# Patient Record
Sex: MALE | Race: ASIAN | Employment: FULL TIME | URBAN - METROPOLITAN AREA
[De-identification: names, ages, dates, MRNs, and addresses within clinical notes are randomized per-mention and may not be internally consistent; named-entity substitution may affect disease eponyms.]

---

## 2020-01-31 ENCOUNTER — HOSPITAL ENCOUNTER (EMERGENCY)
Facility: HOSPITAL | Age: 30
Discharge: HOME | End: 2020-01-31
Attending: EMERGENCY MEDICINE
Payer: COMMERCIAL

## 2020-01-31 VITALS
TEMPERATURE: 101 F | DIASTOLIC BLOOD PRESSURE: 82 MMHG | HEART RATE: 100 BPM | HEIGHT: 75 IN | OXYGEN SATURATION: 98 % | SYSTOLIC BLOOD PRESSURE: 145 MMHG | WEIGHT: 290 LBS | BODY MASS INDEX: 36.06 KG/M2 | RESPIRATION RATE: 20 BRPM

## 2020-01-31 DIAGNOSIS — R50.9 FEBRILE ILLNESS, ACUTE: ICD-10-CM

## 2020-01-31 DIAGNOSIS — J11.1 INFLUENZA: Primary | ICD-10-CM

## 2020-01-31 PROCEDURE — 99283 EMERGENCY DEPT VISIT LOW MDM: CPT

## 2020-01-31 RX ORDER — OSELTAMIVIR PHOSPHATE 75 MG/1
75 CAPSULE ORAL 2 TIMES DAILY
Qty: 10 CAPSULE | Refills: 0 | Status: SHIPPED | OUTPATIENT
Start: 2020-01-31 | End: 2020-02-05

## 2020-01-31 RX ORDER — ACETAMINOPHEN 500 MG
1000 TABLET ORAL ONCE
Status: DISCONTINUED | OUTPATIENT
Start: 2020-01-31 | End: 2020-01-31

## 2020-01-31 RX ORDER — IBUPROFEN 600 MG/1
600 TABLET ORAL ONCE
Status: COMPLETED | OUTPATIENT
Start: 2020-01-31 | End: 2020-01-31

## 2020-01-31 NOTE — ED PROVIDER NOTES
Patient Seen in: [facility] Emergency Department      History   Patient presents with:  Fever    Stated Complaint: fever, chills    HPI    61-year-old male presents emergency room for evaluation of fever with cough runny nose and body aches.   Nicole swelling, there is no mastoid erythema or tenderness. Scalp is atraumatic. NECK: Neck is supple, there is no nuchal rigidity. No carotid bruits. No masses. Trachea midline. No cervical lymphadenopathy. HEART: Regular rate and rhythm, no murmurs.   MARIBEL

## 2020-01-31 NOTE — ED INITIAL ASSESSMENT (HPI)
Body aches, chills started Thursday am, fever Thursday night. This am fever, body sweats, shaking of extremities.

## 2020-02-06 ENCOUNTER — PATIENT OUTREACH (OUTPATIENT)
Dept: FAMILY MEDICINE CLINIC | Facility: CLINIC | Age: 30
End: 2020-02-06

## 2020-02-06 NOTE — PROGRESS NOTES
Patient was seen in the ER on 1/31/20. Left message for patient to call office to schedule a follow up.

## 2021-11-07 ENCOUNTER — HOSPITAL ENCOUNTER (EMERGENCY)
Facility: HOSPITAL | Age: 31
Discharge: HOME | End: 2021-11-07
Attending: PEDIATRICS
Payer: COMMERCIAL

## 2021-11-07 VITALS
OXYGEN SATURATION: 96 % | BODY MASS INDEX: 34.19 KG/M2 | HEART RATE: 92 BPM | RESPIRATION RATE: 16 BRPM | TEMPERATURE: 98 F | HEIGHT: 75 IN | DIASTOLIC BLOOD PRESSURE: 77 MMHG | WEIGHT: 275 LBS | SYSTOLIC BLOOD PRESSURE: 145 MMHG

## 2021-11-07 DIAGNOSIS — S09.90XA INJURY OF HEAD, INITIAL ENCOUNTER: Primary | ICD-10-CM

## 2021-11-07 DIAGNOSIS — S01.81XA FACIAL LACERATION, INITIAL ENCOUNTER: ICD-10-CM

## 2021-11-07 PROCEDURE — 99283 EMERGENCY DEPT VISIT LOW MDM: CPT

## 2021-11-07 NOTE — ED PROVIDER NOTES
Patient Seen in: [facility] Emergency Department      History   Patient presents with:  Laceration/Abrasion    Stated Complaint: lac to eye post fall, pt sts he tripped and hit corner of table     Subjective:   HPI    Patient is a 16-year-old male h perfused. Dermatologic exam: Jagged 2.5 cm laceration to the left orbital area. No active bleeding no obvious foreign body. Neurologic exam: Cranial nerves 2-12 grossly intact. Orthopedic exam: normal,from.        ED Course   Labs Reviewed - No data t

## 2022-08-04 ENCOUNTER — HOSPITAL ENCOUNTER (EMERGENCY)
Facility: HOSPITAL | Age: 32
Discharge: HOME | End: 2022-08-04
Payer: COMMERCIAL

## 2022-08-04 ENCOUNTER — APPOINTMENT (OUTPATIENT)
Dept: GENERAL RADIOLOGY | Facility: HOSPITAL | Age: 32
End: 2022-08-04
Payer: COMMERCIAL

## 2022-08-04 VITALS
WEIGHT: 280 LBS | DIASTOLIC BLOOD PRESSURE: 63 MMHG | TEMPERATURE: 98 F | SYSTOLIC BLOOD PRESSURE: 124 MMHG | OXYGEN SATURATION: 99 % | HEIGHT: 75 IN | HEART RATE: 73 BPM | BODY MASS INDEX: 34.82 KG/M2 | RESPIRATION RATE: 16 BRPM

## 2022-08-04 DIAGNOSIS — M25.511 ACUTE PAIN OF RIGHT SHOULDER: Primary | ICD-10-CM

## 2022-08-04 PROCEDURE — 99283 EMERGENCY DEPT VISIT LOW MDM: CPT

## 2022-08-04 PROCEDURE — 73030 X-RAY EXAM OF SHOULDER: CPT

## 2022-08-04 RX ORDER — METHYLPREDNISOLONE 4 MG/1
TABLET ORAL
Qty: 1 EACH | Refills: 0 | Status: SHIPPED | OUTPATIENT
Start: 2022-08-04

## 2022-08-04 RX ORDER — CYCLOBENZAPRINE HCL 10 MG
10 TABLET ORAL 3 TIMES DAILY PRN
Qty: 20 TABLET | Refills: 0 | Status: SHIPPED | OUTPATIENT
Start: 2022-08-04 | End: 2022-08-11

## 2022-08-04 RX ORDER — NAPROXEN 500 MG/1
500 TABLET ORAL 2 TIMES DAILY PRN
Qty: 20 TABLET | Refills: 0 | Status: SHIPPED | OUTPATIENT
Start: 2022-08-04

## 2024-11-05 ENCOUNTER — APPOINTMENT (OUTPATIENT)
Dept: GENERAL RADIOLOGY | Age: 34
End: 2024-11-05
Attending: PHYSICIAN ASSISTANT
Payer: COMMERCIAL

## 2024-11-05 ENCOUNTER — HOSPITAL ENCOUNTER (EMERGENCY)
Age: 34
Discharge: HOME | End: 2024-11-05
Attending: EMERGENCY MEDICINE
Payer: COMMERCIAL

## 2024-11-05 VITALS
RESPIRATION RATE: 16 BRPM | BODY MASS INDEX: 37.92 KG/M2 | HEART RATE: 81 BPM | WEIGHT: 305 LBS | HEIGHT: 75 IN | SYSTOLIC BLOOD PRESSURE: 126 MMHG | OXYGEN SATURATION: 96 % | DIASTOLIC BLOOD PRESSURE: 70 MMHG | TEMPERATURE: 100 F

## 2024-11-05 DIAGNOSIS — B34.9 VIRAL SYNDROME: Primary | ICD-10-CM

## 2024-11-05 LAB
POCT INFLUENZA A: NEGATIVE
POCT INFLUENZA B: NEGATIVE
SARS-COV-2 RNA RESP QL NAA+PROBE: NOT DETECTED

## 2024-11-05 PROCEDURE — 87430 STREP A AG IA: CPT | Performed by: PHYSICIAN ASSISTANT

## 2024-11-05 PROCEDURE — 99284 EMERGENCY DEPT VISIT MOD MDM: CPT

## 2024-11-05 PROCEDURE — 87502 INFLUENZA DNA AMP PROBE: CPT | Performed by: PHYSICIAN ASSISTANT

## 2024-11-05 PROCEDURE — 71046 X-RAY EXAM CHEST 2 VIEWS: CPT | Performed by: PHYSICIAN ASSISTANT

## 2024-11-05 RX ORDER — ACETAMINOPHEN 500 MG
1000 TABLET ORAL ONCE
Status: COMPLETED | OUTPATIENT
Start: 2024-11-05 | End: 2024-11-05

## 2024-11-05 NOTE — ED INITIAL ASSESSMENT (HPI)
Sweating x5 days - states fevers first 3 days - can't stop sweating - denies cough - denies sore throat

## 2024-11-05 NOTE — ED PROVIDER NOTES
Patient Seen in: [facility] In [place]      History     Chief Complaint   Patient presents with    Fever     Stated Complaint: sweating x5 days - denies cough - c/o fevers    Subjective:   HPI      34-year-old male presents to the  for evaluation of fever and sweats for 5 days.  Tmax 102 at home.  No cough, sore throat or nasal congestions.  Developed a headache today.  Son with URI symptoms. No neck pain.    Objective:     History reviewed. No pertinent past medical history.           History reviewed. No pertinent surgical history.             Social History     Socioeconomic History    Marital status: Single   Tobacco Use    Smoking status: Never    Smokeless tobacco: Never   Vaping Use    Vaping status: Some Days   Substance and Sexual Activity    Alcohol use: Yes     Comment: rare    Drug use: Not Currently                  Physical Exam     ED Triage Vitals [11/05/24 1336]   /82   Pulse 76   Resp 15   Temp 99.5 °F (37.5 °C)   Temp src Oral   SpO2 98 %   O2 Device None (Room air)       Current Vitals:   Vital Signs  BP: 126/70  Pulse: 81  Resp: 16  Temp: 100.2 °F (37.9 °C)  Temp src: Oral    Oxygen Therapy  SpO2: 96 %  O2 Device: None (Room air)        Physical Exam  Vitals and nursing note reviewed.   Constitutional:       Appearance: He is well-developed.   HENT:      Head: Atraumatic.      Right Ear: Tympanic membrane and external ear normal.      Left Ear: Tympanic membrane and external ear normal.      Nose: Nose normal.      Mouth/Throat:      Mouth: Mucous membranes are moist.      Pharynx: Posterior oropharyngeal erythema present.   Eyes:      Conjunctiva/sclera: Conjunctivae normal.   Cardiovascular:      Rate and Rhythm: Normal rate and regular rhythm.   Pulmonary:      Effort: Pulmonary effort is normal.      Breath sounds: Normal breath sounds.   Musculoskeletal:      Cervical back: Normal range of motion and neck supple. No rigidity.   Skin:     General: Skin is warm  and dry.      Capillary Refill: Capillary refill takes less than 2 seconds.   Neurological:      Mental Status: He is alert and oriented to person, place, and time.   Psychiatric:         Mood and Affect: Mood normal.             ED Course     Labs Reviewed   RAPID SARS-COV-2 BY PCR - Normal   POCT FLU TEST - Normal    Narrative:     This assay is a rapid molecular in vitro test utilizing nucleic acid amplification of influenza A and B viral RNA.   RAPID STREP A SCREEN (LC) - Normal    Narrative:     A confirmatory culture is recommended if clinically indicated.          XR CHEST PA + LAT CHEST (CPT=71046)    Result Date: 11/5/2024  PROCEDURE:  XR CHEST PA + LAT CHEST (CPT=71046)  INDICATIONS:  sweating x5 days - denies cough - c/o fevers  COMPARISON:  [facility], XR, XR CHEST PA + LAT CHEST (CPT=71020), 12/21/2016, 8:36 AM.  TECHNIQUE:  PA and lateral chest radiographs were obtained.  PATIENT STATED HISTORY: (As transcribed by Technologist)  Patient has sweating and fever for 5 days.    FINDINGS:  LUNGS:  No focal consolidation.  Normal vascularity. CARDIAC:  Normal size cardiac silhouette. MEDIASTINUM:  Normal. PLEURA:  Normal.  No pleural effusions. BONES:  Normal for age.            CONCLUSION:  There is no evidence of active cardiopulmonary disease.   LOCATION:  [facility]   Dictated by (CST): Aly Carosn MD on 11/05/2024 at 3:53 PM     Finalized by (CST): Aly Carson MD on 11/05/2024 at 3:54 PM               MDM        Well-appearing 34-year-old male presents to the ER with fever and chills for 4 to 5 days.  Denies any symptoms.    Differential diagnosis includes but not limited to:  Influenza, pneumonia, strep, COVID    Flu, strep and COVID are negative.  Chest x-ray shows no acute abnormality.  I have reviewed the x-ray images independently and noted no opacities.    Patient informed of all results.  Likely viral illness that is self-limiting.  Advised patient to continue antipyretics over-the-counter for  symptomatic relief.  All questions answered.      Medical Decision Making      Disposition and Plan     Clinical Impression:  1. Viral syndrome         Disposition:  Discharge  11/5/2024  4:15 pm    Follow-up:  [facility] in [address] [address]  [phone]  Follow up  If symptoms worsen          Medications Prescribed:  Discharge Medication List as of 11/5/2024  4:18 PM              Supplementary Documentation:

## 2024-11-05 NOTE — ED PROVIDER NOTES
34-year-old male who was seen by me as well as by the physician assistant has been having some issues with fever and sweats today had a headache with things he is not having any sinus pressure or pain.  He has an occasional cough but nothing of significance.  No urgency frequency or dysuria.  He states that his son had some upper respiratory symptoms but he had overall been doing okay.  He was negative for flu and COVID.  He had a chest x-ray that personally reviewed did not appreciate a obvious consolidation pneumothorax or pleural effusion.  I reviewed radiology report they did not appreciate any acute abnormality.  Patient does have a bit of a fever.  At this time he is otherwise a healthy male and this is most likely viral etiology.  He may require further workup if it is persistent.  At this time he still well early in the window.  He does not have a sore throat or any significant lymphadenopathy.  I agree with the assessment and plan    I reviewed that chart and discussed the case.  I have examined the patient and noted alert in no respiratory distress with no clear source of infection     I did the substantive portion of the medical decision making.     I agree with the following clinical impression(s):  Viral syndrome  (primary encounter diagnosis).     I agree with the plan as noted.

## (undated) NOTE — ED AVS SNAPSHOT
David Campbell   MRN: [id number]    Department:  [facility] Emergency Department   Date of Visit:  1/31/2020           Disclosure     Insurance plans vary and the physician(s) referred by the ER may not be covered by your plan.  Please contact tell this physician (or your personal doctor if your instructions are to return to your personal doctor) about any new or lasting problems. The primary care or specialist physician will see patients referred from the [facility] Emergency Department.  Anna Hernandez